# Patient Record
Sex: MALE | Race: BLACK OR AFRICAN AMERICAN | Employment: UNEMPLOYED | URBAN - METROPOLITAN AREA
[De-identification: names, ages, dates, MRNs, and addresses within clinical notes are randomized per-mention and may not be internally consistent; named-entity substitution may affect disease eponyms.]

---

## 2021-08-28 ENCOUNTER — ANCILLARY PROCEDURE (OUTPATIENT)
Dept: GENERAL RADIOLOGY | Facility: CLINIC | Age: 46
End: 2021-08-28
Attending: FAMILY MEDICINE
Payer: COMMERCIAL

## 2021-08-28 ENCOUNTER — OFFICE VISIT (OUTPATIENT)
Dept: URGENT CARE | Facility: URGENT CARE | Age: 46
End: 2021-08-28
Payer: COMMERCIAL

## 2021-08-28 VITALS
TEMPERATURE: 98 F | HEART RATE: 78 BPM | RESPIRATION RATE: 20 BRPM | DIASTOLIC BLOOD PRESSURE: 78 MMHG | OXYGEN SATURATION: 98 % | SYSTOLIC BLOOD PRESSURE: 125 MMHG

## 2021-08-28 DIAGNOSIS — M79.672 LEFT FOOT PAIN: ICD-10-CM

## 2021-08-28 DIAGNOSIS — S92.325A CLOSED NONDISPLACED FRACTURE OF SECOND METATARSAL BONE OF LEFT FOOT, INITIAL ENCOUNTER: Primary | ICD-10-CM

## 2021-08-28 DIAGNOSIS — S92.335A CLOSED NONDISPLACED FRACTURE OF THIRD METATARSAL BONE OF LEFT FOOT, INITIAL ENCOUNTER: ICD-10-CM

## 2021-08-28 DIAGNOSIS — S92.345A CLOSED NONDISPLACED FRACTURE OF FOURTH METATARSAL BONE OF LEFT FOOT, INITIAL ENCOUNTER: ICD-10-CM

## 2021-08-28 PROCEDURE — 73630 X-RAY EXAM OF FOOT: CPT | Mod: LT | Performed by: RADIOLOGY

## 2021-08-28 PROCEDURE — 99204 OFFICE O/P NEW MOD 45 MIN: CPT | Performed by: FAMILY MEDICINE

## 2021-08-28 RX ORDER — DORZOLAMIDE HCL 20 MG/ML
SOLUTION/ DROPS OPHTHALMIC
COMMUNITY
Start: 2021-08-23

## 2021-08-28 RX ORDER — LATANOPROST 50 UG/ML
SOLUTION/ DROPS OPHTHALMIC
COMMUNITY
Start: 2021-08-21

## 2021-08-28 RX ORDER — LEVOTHYROXINE SODIUM 25 UG/1
25 TABLET ORAL
COMMUNITY
Start: 2021-04-15 | End: 2022-04-15

## 2021-08-28 NOTE — PROGRESS NOTES
SUBJECTIVE:  Chief Complaint   Patient presents with     Urgent Care     Musculoskeletal Problem     L leg pain pt fell yesterday pt cant put any weight on it.     Justin Sharma is a 46 year old male presents with a chief complaint of left dorsal foot pain.  The injury occurred one day ago.   The injury happened while at home. How: Patient was walking down the stairs and fell forward, landing on the left foot..  The patient complained of a lot of pain at the proximal left dorsal foot  and has had decreased ROM.  Pain exacerbated by weight bearing and walking..  He treated it initially with ice, Ibuprofen. This is the first time this type of injury has occurred to this patient.     No previous left foot injuries.      Past Medical History:    Hypothyroidism  Bilateral glaucoma.      Current Outpatient Medications   Medication Sig Dispense Refill     dorzolamide (TRUSOPT) 2 % ophthalmic solution INSTILL 1 DROP IN BOTH EYES TWICE DAILY       latanoprost (XALATAN) 0.005 % ophthalmic solution INSTILL 1 DROP IN BOTH EYES EVERY NIGHT AT BEDTIME       levothyroxine (SYNTHROID/LEVOTHROID) 25 MCG tablet Take 25 mcg by mouth       Social History     Tobacco Use     Smoking status: Not on file   Substance Use Topics     Alcohol use: Not on file       ROS:  CONSTITUTIONAL:negative for fevers.    MUSCULOSKELETAL: positive for left foot pain and swelling.     EXAM:   /78   Pulse 78   Temp 98  F (36.7  C) (Tympanic)   Resp 20   SpO2 98%   Gen: healthy,alert,and in pain.   Extremity: left foot has edema over the entire dorsal left foot.  There is pain over the entire dorsal proximal/mid-foot region. .     X-RAY was done. I viewed all X-ray images during this clinic encounter.  The X-rays of the left foot showed a fracture at the proximal aspect of the second metatarsal bone. .    The radiology report found nondisplaced fractures at the bases of the second, third, and fourth metatarsal bones.      ASSESSMENT:   Left foot  pain  Closed fracture of the proximal second, third and fourth metatarsal bones of the left foot.      PLAN:    Crutches were given to the patient.      Rx:  Tylenol #3 (Disp:  10 tablets)    Place ice.    Elevate the left foot    Wrap the left foot with ACE bandages.      follow up with a podiatrist or orthopedic specialist for further evaluation and treatment.            Thad Davenport MD     English

## 2021-08-28 NOTE — PATIENT INSTRUCTIONS
Use the crutches to get around.  Avoid bearing weight onto the left foot.      Elevate the left foot above the level of the heart.      Place ice onto the left foot for 15 minutes at a time, every 2-3 hours while awake.      Wrap the left foot with ACE bandages.      follow up with a podiatrist or orthopedic specialist for further evaluation and treatment within the next week.  .

## 2021-08-30 ENCOUNTER — TELEPHONE (OUTPATIENT)
Dept: PODIATRY | Facility: CLINIC | Age: 46
End: 2021-08-30

## 2021-08-30 NOTE — TELEPHONE ENCOUNTER
Phone call to patient. Offered appointment on 9/1/21 with Dr. Langford with 11:00 arrival.   He then put a female on the phone to get the address. Address and phone number provided. She states patient will come to appointment and it was scheduled.     NAUN Wolfe RN

## 2021-08-30 NOTE — TELEPHONE ENCOUNTER
Patient has a 3-5 day order to be seen for Closed nondisplaced fx of second metatarsal bone of left foot.  No available openings w/in that timeframe.  Please triage and help to get pt scheduled appropriately.      Judy jordan

## 2021-09-01 ENCOUNTER — OFFICE VISIT (OUTPATIENT)
Dept: PODIATRY | Facility: CLINIC | Age: 46
End: 2021-09-01
Payer: COMMERCIAL

## 2021-09-01 VITALS — DIASTOLIC BLOOD PRESSURE: 80 MMHG | SYSTOLIC BLOOD PRESSURE: 120 MMHG | HEIGHT: 70 IN

## 2021-09-01 DIAGNOSIS — S92.325A CLOSED NONDISPLACED FRACTURE OF SECOND METATARSAL BONE OF LEFT FOOT, INITIAL ENCOUNTER: ICD-10-CM

## 2021-09-01 DIAGNOSIS — S93.325A LISFRANC DISLOCATION, LEFT, INITIAL ENCOUNTER: Primary | ICD-10-CM

## 2021-09-01 PROCEDURE — 99204 OFFICE O/P NEW MOD 45 MIN: CPT | Performed by: PODIATRIST

## 2021-09-01 RX ORDER — LORATADINE 10 MG/1
10 TABLET ORAL DAILY PRN
COMMUNITY

## 2021-09-01 NOTE — PROGRESS NOTES
ASSESSMENT:  Encounter Diagnoses   Name Primary?     Closed nondisplaced fracture of second metatarsal bone of left foot, initial encounter      Lisfranc fracture/dislocation, left, initial encounter Yes     MEDICAL DECISION MAKING:  I discussed the potential severity of this injury, including risk of long-term consequences including pain, midfoot arthritis and surgical intervention.    I personally reviewed the x-ray images and shared my findings with Justin and his wife.    The treatment plan reviewed included:  MRI to better characterize the extent of injury.  Nonweightbearing for 6 to 8 weeks  Tall Aircast for protection.  He was instructed to remove this for ankle range of motion exercises.  In addition to crutches, a wheeled knee walker was ordered.  We discussed icing and elevating.    The fractures are minimally displaced and there is no gross dislocation across the tarsometatarsal joints.  I favor a conservative approach, however explained that he might need a surgical intervention in the future.  This would involve fusion and stabilization of arthritic joints, if significant pain.  Otherwise midfoot arthritic pain might be controlled with custom orthoses and quality shoes.    Follow-up in 3 weeks recommended.    Disclaimer: This note consists of symbols derived from keyboarding, dictation and/or voice recognition software. As a result, there may be errors in the script that have gone undetected. Please consider this when interpreting information found in this chart.    Justin Langford DPM, FACFAS, MS    [place] [facility] Pod[facility]/Foot & Ankle Surgery      ____________________________________________________________________    HPI:       Follow up from an Urgent Care visit with Dr. Davenport on 8/28/21.  Chief Complaint: left foot injury  He reports falling forwards down stairs on Friday. Immediate pain.   He was evaluated in urgent care.  X-rays show nondisplaced fractures at the base of the second third  "and fourth metatarsal, intra-articular.  He was provided crutches Tylenol 3 instructed to ice and elevate.  He was referred to podiatry for ongoing management.  Current pain is an 8 out of 10 at worst.  He is not currently employed.    *No past medical history on file.*  *No past surgical history on file.*  *  Current Outpatient Medications   Medication Sig Dispense Refill     dorzolamide (TRUSOPT) 2 % ophthalmic solution INSTILL 1 DROP IN BOTH EYES TWICE DAILY       latanoprost (XALATAN) 0.005 % ophthalmic solution INSTILL 1 DROP IN BOTH EYES EVERY NIGHT AT BEDTIME       levothyroxine (SYNTHROID/LEVOTHROID) 25 MCG tablet Take 25 mcg by mouth       loratadine (CLARITIN) 10 MG tablet Take 10 mg by mouth daily as needed for allergies         EXAM:    Vitals: /80   Ht 1.778 m (5' 10\")   BMI: There is no height or weight on file to calculate BMI.    Constitutional:  Justin Sharma is in no apparent distress, appears well-nourished.  Cooperative with history and physical exam.    Vascular:  Pedal pulses are palpable for both the DP and PT arteries.  CFT < 3 sec. significant left foot edema.    Neuro: Light touch sensation is intact to the L4, L5, S1 distributions  No evidence of weakness, spasticity, or contracture in the lower extremities.     Derm: Normal texture and turgor.  No erythema, ecchymosis, or cyanosis.  No open lesions.  No fracture blisters.  No ecchymosis seen in the plantar arch of the left foot.    Musculoskeletal:    Lower extremity muscle strength is normal. No gross deformities.      X-Ray Findings:   Examination:  XR FOOT LEFT G/E 3 VIEWS     Date:  8/28/2021 11:03 AM      Clinical Information: Left pain and swelling.     Comparison: none.                                                                      Impression:     1.  Acute minimally displaced fractures involving the bases of the  second, third, and fourth metatarsals, with intra-articular extension  to the TMT joints. Minimal " fracture displacement, 2 mm. No clear  diastases of the Lisfranc ligament/joint space. Consider CT for more  complete characterization.     2.  No fracture visualized elsewhere. No significant joint subluxation  or dislocation. Soft tissue swelling in the midfoot.     KAIN PUTNAM MD

## 2021-09-01 NOTE — PATIENT INSTRUCTIONS
Thank you for choosing [facility] Podiatry / Foot & Ankle Surgery!    DR. HENDRIX'S CLINIC LOCATIONS     [facility] SCHEDULE SURGERY: [phone]   [address] [place] [address] APPOINTMENTS: [phone]   [place] [address] BILLING QUESTIONS: [phone]  [phone] RADIOLOGY: [phone]       [facility]    [address]    [place] [address]    [phone]  [phone]      Follow up: 3 weeks    Please call to schedule your MRI/CT/Ultrasound/Arthrogram appointment.  The number is [phone].    Home Medical Equipment:  1. [facility]    [facility] -[address]  [address]   [place] [phone]     2. [facility] - [address] [place] [address], [place], [phone]  LlSFRANC INJURY     Lisfranc injuries involve the midfoot area. The Lisfranc jOints are between the first metatarsal, second metarsal, medial cuneiform and intermediate cuneiform. The Lisfranc complex involves all of the tarsometatarsal joints. There is also a Lisfranc ligament that runs on the bottom of the medial cuneiform to the second metatarsal base. Lisfranc  injuries, to the midfoot, can include sprains, dislocations, fractures (broken bones) or all of the above. Two very serious complications can be seen with Lisfranc injuries. They' are injury to the dorsalis pedis artery or compartment syndrome. These are both medical emergencies and need surgery right away.     CAUSES   Lisfranc injuries are most often caused by some type of trauma. The trauma can be   either direct or indirect. An example of direct trauma would be dropping something on   the foot. Indirect trauma usually involves a twisting of the foot after it gets caught on   something. Lisfranc injuries account for about one percent of all fractures. They are not common, but sports medicine doctors tend to see the injury more frequently.  Lisfranc injuries can occur in car accidents,  personnel, horse riders and in many sports such as; football, baseball and soccer to name a few.     SIGNS & SYMPTOMS   Swelling of the foot and/or ankle, bruising of the foot and/or ankle, pain usually in the middle part of the foot, difficulty stepping on the foot (due to pain), widening of the midfoot area and large bump on the top midfoot area (due to bone dislocation)     DIAGNOSIS   First your doctor will do a thorough history and physical exam. He or she will be sure to check your pulse (dorsalis pedis artery) on the top of your foot because sometimes this artery can be injured due to the trauma. Your doctor will also make sure you do not have a compartment syndrome, which is a build-up of pressure in the foot causing excruciating pain. The excessive pressure can cause damage to the soft tissues, nerves, arteries and muscles. Compartment syndrome is a medical emergency that requires surgery to relieve the pressure. Next, you will probably get x-rays of your injured foot. The x-ray may be weight bearing (standing on the foot) arnon-weight bearing. Sometimes your healthy foot is also x-rayed for comparison. Lisfranc injuries can be difficult to see on plain x-rays. If there is a question about it, your doctor may send you for a CT (computed tomography) scan. A CT scan will allow the doctor to see the bones in much better detail as it has three dimensional views. An MRI (magnetic resonance imaging) is also occasionally used to evaluate the soft tissues, especially the Lisfranc ligament. An MRI is not usually needed if there is dislocation or fracture because this usually signifies that the Lisfranc ligament has already ruptured. If the dorsalis pedis artery is not felt by hand, then a Doppler ultrasound may be needed to assess the artery.     TREATMENT   The treatment for Lisfranc injuries varies depending on if you had a sprain, dislocation or fracture or  all of the above. Treatment also depends on when you were diagnosed with the injury. Generally speaking, if you have sustained a Lisfranc sprain you will need to be non-weight bearing (no weight on the foot) in a cast or removable cast boot for 4-6 weeks. This will usually be followed by physical therapy and gradual return to activity. Lisfranc dislocation and fracture injuries are usually treated with surgery. It is important to try and line up the bones and joints as much as possible. This is done with wires, screws or plates. After surgery, you are usually non-weight bearing for six weeks and then you begin walking with a cast walking boot for another four weeks. If the bones and joints are not lined up properly there is increased risk of degenerative changes which can lead to arthritis and instability. If pain in the midfoot continues for a long time, sometimes a second surgery is needed to fuse (arthrodese) the midfoot bones together.     AIRCAST / CAM WALKING BOOT INSTRUCTIONS  - Do NOT drive with CAM walker on. This is due to safety and legal issues.   - Do NOT wear the CAM walker on long car/train rides or on an airplane.  - Remove the CAM walker several times a day and do ankle range of motion (ROM) exercises/wiggle toes.  - It is recommended that a thick-soled shoe be worn on the other foot to offset any created leg length issue.   - The boot does not have to be worn at night.   - There is an increased risk of developing a blood clot with lower extremity immobilization. ROM exercises and knee-high compression (tenso /ACE wrap) is recommended to lower that risk.   - You should seek medical attention if you experience calf swelling and/or pain, chest pain, or shortness of breath.     If you need to return or exchange the boot, please contact [facility] @ [phone]

## 2021-09-01 NOTE — LETTER
9/1/2021         RE: Justin Sharma  [address]  [place] [address]        Dear Colleague,    Thank you for referring your patient, Justin Sharma, to the [facility] PODIATRY. Please see a copy of my visit note below.    ASSESSMENT:  Encounter Diagnoses   Name Primary?     Closed nondisplaced fracture of second metatarsal bone of left foot, initial encounter      Lisfranc fracture/dislocation, left, initial encounter Yes     MEDICAL DECISION MAKING:  I discussed the potential severity of this injury, including risk of long-term consequences including pain, midfoot arthritis and surgical intervention.    I personally reviewed the x-ray images and shared my findings with Justin and his wife.    The treatment plan reviewed included:  MRI to better characterize the extent of injury.  Nonweightbearing for 6 to 8 weeks  Tall Aircast for protection.  He was instructed to remove this for ankle range of motion exercises.  In addition to crutches, a wheeled knee walker was ordered.  We discussed icing and elevating.    The fractures are minimally displaced and there is no gross dislocation across the tarsometatarsal joints.  I favor a conservative approach, however explained that he might need a surgical intervention in the future.  This would involve fusion and stabilization of arthritic joints, if significant pain.  Otherwise midfoot arthritic pain might be controlled with custom orthoses and quality shoes.    Follow-up in 3 weeks recommended.    Disclaimer: This note consists of symbols derived from keyboarding, dictation and/or voice recognition software. As a result, there may be errors in the script that have gone undetected. Please consider this when interpreting information found in this chart.    Justin Langford, HOLLIS, FACFAS, [facility] Department of Podiatry/Foot & Ankle Surgery      ____________________________________________________________________    HPI:       Follow up from an  "Urgent Care visit with Dr. Davenport on 8/28/21.  Chief Complaint: left foot injury  He reports falling forwards down stairs on Friday. Immediate pain.   He was evaluated in urgent care.  X-rays show nondisplaced fractures at the base of the second third and fourth metatarsal, intra-articular.  He was provided crutches Tylenol 3 instructed to ice and elevate.  He was referred to podiatry for ongoing management.  Current pain is an 8 out of 10 at worst.  He is not currently employed.    *No past medical history on file.*  *No past surgical history on file.*  *  Current Outpatient Medications   Medication Sig Dispense Refill     dorzolamide (TRUSOPT) 2 % ophthalmic solution INSTILL 1 DROP IN BOTH EYES TWICE DAILY       latanoprost (XALATAN) 0.005 % ophthalmic solution INSTILL 1 DROP IN BOTH EYES EVERY NIGHT AT BEDTIME       levothyroxine (SYNTHROID/LEVOTHROID) 25 MCG tablet Take 25 mcg by mouth       loratadine (CLARITIN) 10 MG tablet Take 10 mg by mouth daily as needed for allergies         EXAM:    Vitals: /80   Ht 1.778 m (5' 10\")   BMI: There is no height or weight on file to calculate BMI.    Constitutional:  Justin Sharma is in no apparent distress, appears well-nourished.  Cooperative with history and physical exam.    Vascular:  Pedal pulses are palpable for both the DP and PT arteries.  CFT < 3 sec. significant left foot edema.    Neuro: Light touch sensation is intact to the L4, L5, S1 distributions  No evidence of weakness, spasticity, or contracture in the lower extremities.     Derm: Normal texture and turgor.  No erythema, ecchymosis, or cyanosis.  No open lesions.  No fracture blisters.  No ecchymosis seen in the plantar arch of the left foot.    Musculoskeletal:    Lower extremity muscle strength is normal. No gross deformities.      X-Ray Findings:   Examination:  XR FOOT LEFT G/E 3 VIEWS     Date:  8/28/2021 11:03 AM      Clinical Information: Left pain and swelling.     Comparison: none.        "                                                               Impression:     1.  Acute minimally displaced fractures involving the bases of the  second, third, and fourth metatarsals, with intra-articular extension  to the TMT joints. Minimal fracture displacement, 2 mm. No clear  diastases of the Lisfranc ligament/joint space. Consider CT for more  complete characterization.     2.  No fracture visualized elsewhere. No significant joint subluxation  or dislocation. Soft tissue swelling in the midfoot.     KAIN PUTNAM MD             Again, thank you for allowing me to participate in the care of your patient.        Sincerely,        Justin Langford DPM

## 2021-09-01 NOTE — LETTER
[facility] PODIATRY  [address]  [address]  [place] [address]  Phone: [phone]  Fax: [phone]      REPORT OF WORK ABILITY    Date: 9/1/2021                     Employee Name: Justin Sharma         YOB: 1975      To Whom It May Concern:    Justin Sharma was evaluated in our clinic today. He has sustained a left foot injury involving multiple fractures and possible ligament injury.  At this time it is recommended that he not work at all.  As of 9/27/21, seated work only is reasonable.  An MRI is ordered. He will be non weight bearing for 6-8 weeks a will follow up in clinic in 3-4 weeks.        Justin Langford DPM, FACFAS, MS  [facility] Department of Podiatry/Foot & Ankle Surgery

## 2021-09-23 ENCOUNTER — ANCILLARY PROCEDURE (OUTPATIENT)
Dept: GENERAL RADIOLOGY | Facility: CLINIC | Age: 46
End: 2021-09-23
Attending: PODIATRIST
Payer: COMMERCIAL

## 2021-09-23 ENCOUNTER — HOSPITAL ENCOUNTER (OUTPATIENT)
Dept: MRI IMAGING | Facility: CLINIC | Age: 46
Discharge: HOME | End: 2021-09-23
Attending: PODIATRIST | Admitting: PODIATRIST
Payer: COMMERCIAL

## 2021-09-23 ENCOUNTER — OFFICE VISIT (OUTPATIENT)
Dept: PODIATRY | Facility: CLINIC | Age: 46
End: 2021-09-23
Payer: COMMERCIAL

## 2021-09-23 VITALS — HEIGHT: 70 IN | DIASTOLIC BLOOD PRESSURE: 74 MMHG | SYSTOLIC BLOOD PRESSURE: 122 MMHG

## 2021-09-23 DIAGNOSIS — S92.325A CLOSED NONDISPLACED FRACTURE OF SECOND METATARSAL BONE OF LEFT FOOT, INITIAL ENCOUNTER: ICD-10-CM

## 2021-09-23 DIAGNOSIS — S93.325A LISFRANC DISLOCATION, LEFT, INITIAL ENCOUNTER: ICD-10-CM

## 2021-09-23 DIAGNOSIS — S92.345D CLOSED NONDISPLACED FRACTURE OF FOURTH METATARSAL BONE OF LEFT FOOT WITH ROUTINE HEALING, SUBSEQUENT ENCOUNTER: ICD-10-CM

## 2021-09-23 DIAGNOSIS — S92.325D CLOSED NONDISPLACED FRACTURE OF SECOND METATARSAL BONE OF LEFT FOOT WITH ROUTINE HEALING, SUBSEQUENT ENCOUNTER: ICD-10-CM

## 2021-09-23 DIAGNOSIS — S92.334D CLOSED NONDISPLACED FRACTURE OF THIRD METATARSAL BONE OF RIGHT FOOT WITH ROUTINE HEALING, SUBSEQUENT ENCOUNTER: ICD-10-CM

## 2021-09-23 DIAGNOSIS — S93.325A LISFRANC DISLOCATION, LEFT, INITIAL ENCOUNTER: Primary | ICD-10-CM

## 2021-09-23 PROCEDURE — 73630 X-RAY EXAM OF FOOT: CPT | Mod: LT | Performed by: RADIOLOGY

## 2021-09-23 PROCEDURE — 73718 MRI LOWER EXTREMITY W/O DYE: CPT | Mod: LT

## 2021-09-23 PROCEDURE — 99213 OFFICE O/P EST LOW 20 MIN: CPT | Performed by: PODIATRIST

## 2021-09-23 PROCEDURE — 73718 MRI LOWER EXTREMITY W/O DYE: CPT | Mod: 26 | Performed by: RADIOLOGY

## 2021-09-23 NOTE — PATIENT INSTRUCTIONS
Thank you for choosing [facility] Podiatry / Foot & Ankle Surgery!    DR. HENDRIX'S CLINIC LOCATIONS     [facility] SCHEDULE SURGERY: [phone]   [address] [place] [address] APPOINTMENTS: [phone]   [place] [address] BILLING QUESTIONS: [phone]  [phone] RADIOLOGY: [phone]       [facility]    [address]    [place] [address]    [phone]  [phone]      MRI needed - [phone]    Next steps: 4 more weeks of nonweight bearing, come back in a month

## 2021-09-23 NOTE — LETTER
9/23/2021         RE: Justin Sharma  [address]  [place] [address]        Dear Colleague,    Thank you for referring your patient, Justin Sharma, to the [facility] PODIATRY. Please see a copy of my visit note below.    ASSESSMENT:  Encounter Diagnoses   Name Primary?     Lisfranc dislocation, left, initial encounter Yes     Closed nondisplaced fracture of third metatarsal bone of right foot with routine healing, subsequent encounter      Closed nondisplaced fracture of fourth metatarsal bone of left foot with routine healing, subsequent encounter      Closed nondisplaced fracture of second metatarsal bone of left foot with routine healing, subsequent encounter        MEDICAL DECISION MAKING:  Given the extent of injury and ongoing edema, I asked him to be nonweightbearing for at least 8 weeks.  I encouraged him to continue removing the cam walker when seated and doing ankle range of motion exercises.  Ice and elevation will still be helpful.  He was to start a new job.  They are holding his position currently..  He is not able to return to work with crutches or a walking boot.  I explained that I will provide a letter releasing him to work when he is able to walk comfortably in regular shoes.  I cannot give him a definite timeline.  I also encouraged she follow-up for the MRI, as this will give us information involving injury beyond bone.  I reviewed the severity of the injury, the possibility of future arthrosis and need for surgery involving joint fusions.  Follow-up in 1 month.    Disclaimer: This note consists of symbols derived from keyboarding, dictation and/or voice recognition software. As a result, there may be errors in the script that have gone undetected. Please consider this when interpreting information found in this chart.    Justin Langford DPM, FACFAS, MS    [place] Department of Podiatry/Foot & Ankle  "Surgery      ____________________________________________________________________    HPI:       Justin and his wife follow-up for his left foot injury.  Please refer to the 9/1/2021 note for details regarding the injury.  He sustained fractures of the left second, third and fourth metatarsal bases with intra-articular extension.   This is considered a Lisfranc type injury.  An MRI was ordered to better characterize the injury.  Decided to forego the MRI.  He has been nonweightbearing with crutches and wearing an Aircast for protection.  Reports pain reduction  There is some persistent swelling of the foot.    *No past medical history on file.*  *No past surgical history on file.*  *  Current Outpatient Medications   Medication Sig Dispense Refill     dorzolamide (TRUSOPT) 2 % ophthalmic solution INSTILL 1 DROP IN BOTH EYES TWICE DAILY       latanoprost (XALATAN) 0.005 % ophthalmic solution INSTILL 1 DROP IN BOTH EYES EVERY NIGHT AT BEDTIME       levothyroxine (SYNTHROID/LEVOTHROID) 25 MCG tablet Take 25 mcg by mouth       loratadine (CLARITIN) 10 MG tablet Take 10 mg by mouth daily as needed for allergies           EXAM:    Vitals: /74   Ht 1.778 m (5' 10\")   BMI: There is no height or weight on file to calculate BMI.    Constitutional:  Justin Sharma is in no apparent distress, appears well-nourished.  Cooperative with history and physical exam.    Vascular:  Pedal pulses are palpable for both the DP and PT arteries.  CFT < 3 sec.  No edema.      Neuro: Light touch sensation is intact to the L4, L5, S1 distributions  No evidence of weakness, spasticity, or contracture in the lower extremities.     Derm: Normal texture and turgor.  No erythema, ecchymosis, or cyanosis.  No open lesions.     Musculoskeletal:    Lower extremity muscle strength is normal. No gross deformities.  No evidence of any decrease in arch height since last visit.  No obvious forefoot abduction.    X-Ray Findings:  I personally " reviewed the right foot images.  Unchanged from previous exam.  I do not appreciate any interval displacement.              Again, thank you for allowing me to participate in the care of your patient.        Sincerely,        Justin Langford DPM

## 2021-09-23 NOTE — PROGRESS NOTES
ASSESSMENT:  Encounter Diagnoses   Name Primary?     Lisfranc dislocation, left, initial encounter Yes     Closed nondisplaced fracture of third metatarsal bone of right foot with routine healing, subsequent encounter      Closed nondisplaced fracture of fourth metatarsal bone of left foot with routine healing, subsequent encounter      Closed nondisplaced fracture of second metatarsal bone of left foot with routine healing, subsequent encounter        MEDICAL DECISION MAKING:  Given the extent of injury and ongoing edema, I asked him to be nonweightbearing for at least 8 weeks.  I encouraged him to continue removing the cam walker when seated and doing ankle range of motion exercises.  Ice and elevation will still be helpful.  He was to start a new job.  They are holding his position currently..  He is not able to return to work with crutches or a walking boot.  I explained that I will provide a letter releasing him to work when he is able to walk comfortably in regular shoes.  I cannot give him a definite timeline.  I also encouraged she follow-up for the MRI, as this will give us information involving injury beyond bone.  I reviewed the severity of the injury, the possibility of future arthrosis and need for surgery involving joint fusions.  Follow-up in 1 month.    Disclaimer: This note consists of symbols derived from keyboarding, dictation and/or voice recognition software. As a result, there may be errors in the script that have gone undetected. Please consider this when interpreting information found in this chart.    Justin Langford DPM, FACFAS, MS    [facility]      ____________________________________________________________________    HPI:       Justin and his wife follow-up for his left foot injury.  Please refer to the 9/1/2021 note for details regarding the injury.  He sustained fractures of the left second, third and fourth metatarsal bases with  "intra-articular extension.   This is considered a Lisfranc type injury.  An MRI was ordered to better characterize the injury.  Decided to forego the MRI.  He has been nonweightbearing with crutches and wearing an Aircast for protection.  Reports pain reduction  There is some persistent swelling of the foot.    *No past medical history on file.*  *No past surgical history on file.*  *  Current Outpatient Medications   Medication Sig Dispense Refill     dorzolamide (TRUSOPT) 2 % ophthalmic solution INSTILL 1 DROP IN BOTH EYES TWICE DAILY       latanoprost (XALATAN) 0.005 % ophthalmic solution INSTILL 1 DROP IN BOTH EYES EVERY NIGHT AT BEDTIME       levothyroxine (SYNTHROID/LEVOTHROID) 25 MCG tablet Take 25 mcg by mouth       loratadine (CLARITIN) 10 MG tablet Take 10 mg by mouth daily as needed for allergies           EXAM:    Vitals: /74   Ht 1.778 m (5' 10\")   BMI: There is no height or weight on file to calculate BMI.    Constitutional:  Justin Sharma is in no apparent distress, appears well-nourished.  Cooperative with history and physical exam.    Vascular:  Pedal pulses are palpable for both the DP and PT arteries.  CFT < 3 sec.  No edema.      Neuro: Light touch sensation is intact to the L4, L5, S1 distributions  No evidence of weakness, spasticity, or contracture in the lower extremities.     Derm: Normal texture and turgor.  No erythema, ecchymosis, or cyanosis.  No open lesions.     Musculoskeletal:    Lower extremity muscle strength is normal. No gross deformities.  No evidence of any decrease in arch height since last visit.  No obvious forefoot abduction.    X-Ray Findings:  I personally reviewed the right foot images.  Unchanged from previous exam.  I do not appreciate any interval displacement.          "

## 2021-10-21 ENCOUNTER — ANCILLARY PROCEDURE (OUTPATIENT)
Dept: GENERAL RADIOLOGY | Facility: CLINIC | Age: 46
End: 2021-10-21
Attending: PODIATRIST
Payer: COMMERCIAL

## 2021-10-21 ENCOUNTER — OFFICE VISIT (OUTPATIENT)
Dept: PODIATRY | Facility: CLINIC | Age: 46
End: 2021-10-21
Payer: COMMERCIAL

## 2021-10-21 VITALS — HEIGHT: 70 IN | SYSTOLIC BLOOD PRESSURE: 124 MMHG | DIASTOLIC BLOOD PRESSURE: 78 MMHG

## 2021-10-21 DIAGNOSIS — S99.922D INJURY OF LEFT FOOT, SUBSEQUENT ENCOUNTER: Primary | ICD-10-CM

## 2021-10-21 PROCEDURE — 73630 X-RAY EXAM OF FOOT: CPT | Mod: LT | Performed by: RADIOLOGY

## 2021-10-21 PROCEDURE — 99214 OFFICE O/P EST MOD 30 MIN: CPT | Performed by: PODIATRIST

## 2021-10-21 NOTE — LETTER
10/21/2021         RE: Justin Sharma  [address]  [place] [address]        Dear Colleague,    Thank you for referring your patient, Justin Sharma, to the [facility]. Please see a copy of my visit note below.    ASSESSMENT:  Encounter Diagnosis   Name Primary?     Injury of left foot, subsequent encounter Yes   Multiple midfoot fractures as noted below.    MEDICAL DECISION MAKING:  Lisfranc injury, left foot involving multiple fractures: Base of first metatarsal, medial cuneiform, base of second metatarsal, base of third metatarsal, base of fourth metatarsal, lateral cuboid, intermediate cuneiform, lateral cuneiform, hallux proximal phalanx.    He has protected this foot for nearly 8 weeks, initially involving crutches with a cam walker.  He is ambulating in the cam walker and reports ongoing improvement with pain being at worst, 5 out of 10.  There has been interval reduction in edema.    I reviewed the x-ray images with Justin, as well as the MRI report.  I explained that this is a serious injury for a foot.  He is at high risk for future arthrosis.  If significant future pain, midfoot arthrodesis is an option.    At this time I asked him to complete a full 8 weeks in the cam walker, attempt to wean out of it and wear supportive shoes.    He is referred for custom molded multidensity orthotic devices.  Hopefully these will help keep his foot comfortable and reduce any pain from arthrosis.    Recommend he not attempt any weightbearing work for another month.  Follow-up in 1 month.    Disclaimer: This note consists of symbols derived from keyboarding, dictation and/or voice recognition software. As a result, there may be errors in the script that have gone undetected. Please consider this when interpreting information found in this chart.    Justin Langford, HOLLIS, FACFAS, MS    [facility]le  "Surgery      ____________________________________________________________________    HPI:       Justin follows up for his left foot injury.  Please refer to the 9/1/2021 note for details regarding the injury.  He sustained fractures of the left second, third and fourth metatarsal bases with intra-articular extension seen in XR.  An MRI was completed after his last visit, 9/23/21 j- see below  This is considered a Lisfranc type injury.  He was initially nonweightbearing with crutches and wearing an Aircast for protection.  Today he presents in the cam walker alone.  He reports interval reduction in pain.  Pain, at worst, is a 5 out of 10.    *No past medical history on file.*  *No past surgical history on file.*  *  Current Outpatient Medications   Medication Sig Dispense Refill     dorzolamide (TRUSOPT) 2 % ophthalmic solution INSTILL 1 DROP IN BOTH EYES TWICE DAILY       latanoprost (XALATAN) 0.005 % ophthalmic solution INSTILL 1 DROP IN BOTH EYES EVERY NIGHT AT BEDTIME       levothyroxine (SYNTHROID/LEVOTHROID) 25 MCG tablet Take 25 mcg by mouth       loratadine (CLARITIN) 10 MG tablet Take 10 mg by mouth daily as needed for allergies           EXAM:    Vitals: Ht 1.778 m (5' 10\")   BMI: There is no height or weight on file to calculate BMI.    Constitutional:  Justin Sharma is in no apparent distress, appears well-nourished.  Cooperative with history and physical exam.    Vascular:  Pedal pulses are palpable for both the DP and PT arteries.  CFT < 3 sec. interval reduction in left foot edema.    Neuro: Light touch sensation is intact to the L4, L5, S1 distributions  No evidence of weakness, spasticity, or contracture in the lower extremities.     Derm: Normal texture and turgor.  No erythema, ecchymosis, or cyanosis.  No open lesions.     Musculoskeletal:    Lower extremity muscle strength is normal. No gross deformities.  No evidence of any decrease in arch height since last visit.  No obvious forefoot " abduction.  He has some pain on palpation over the left cuboid.  No pain with light manipulation of the midfoot joints.    X-Ray Findings:  I personally reviewed the right foot images.  Unchanged from previous exam.  I do not appreciate any interval displacement.    MR left foot without  contrast 9/23/2021 4:42 PM     History: Foot trauma, Lisfranc suspected, xray done (Age >= 6y);  Closed nondisplaced fracture of second metatarsal bone of left foot,  initial encounter; Lisfranc dislocation, left, initial encounter     Techniques: Multiplanar multisequence imaging of the left foot was  obtained without  administration of intravenous contrast.     Comparison: Radiographs 8/28/2021, 9/23/2021     Findings: Images degraded by motion.     Bones     Nondisplaced intra-articular fracture of the base of the first  metatarsal. Suspect nondisplaced fracture through the medial cuneiform  on sagittal series 7 images 8-10. Comminuted and mildly displaced  intra-articular fracture of the base of the second metatarsal.  Minimally displaced intra-articular fracture of the base of the third  metatarsal. Mild interspace intra-articular fracture of the plantar  base of the fourth metatarsal. Nondisplaced fracture of the distal  lateral cuboid. Probable fracture of the distal lateral intermediate  cuneiform. Probable fracture of the lateral cuneiform as well. Bone  marrow edema like signal throughout the shaft of the second  metatarsal, likely posttraumatic versus stress.      Nondisplaced intra-articular fracture of the head of the first  proximal phalanx, radiographically occult. Loss of fat saturation of  the toes on long axis imaging.     Joints and periarticular soft tissue     Joint effusion: Physiologic amount of joint fluid are present.     Plantar plates: Intersesamoidal ligament and sesamoidal phalangeal  ligaments of the first metatarsophalangeal joints are intact. Plantar  plates of the second through fifth toe at  metatarsophalangeal joints  are grossly intact.     Intermetatarsal spaces: Mild nonspecific soft tissue prominence in the  second interspace an third interspace. No significant intermetatarsal  bursal fluid.     Ligaments and Tendons     Lisfranc interosseous ligament: Lisfranc evaluation compromised by  motion, especially on short axis imaging. Thickening and increased  signal in the interosseous component. Dorsal component not clearly  seen. Thickening and increased signal in the plantar component between  the medial cuneiform and base of the third metatarsal. Plantar  component between the medial cuneiform and base of the second  metatarsal not clearly seen, possibly torn.     Tendons: The visualized courses of flexor and extensor tendons are  grossly intact.     Muscles     Multifocal muscular edema.     ANCILLARY FINDINGS     Dorsal subcutaneous edema.                                                                      Impression: Examination compromised by motion.     1. Multiple intra-articular tarsometatarsal fractures, as detailed  above.  a. Radiographically occult nondisplaced fracture through the head of  the first proximal phalanx.     2. At least a moderate grade Lisfranc complex ligamentous sprain as  detailed above, suboptimally detailed due to patient motion.     MYA DOW MD (Joe)           Again, thank you for allowing me to participate in the care of your patient.        Sincerely,        Justin Langford DPM

## 2021-10-21 NOTE — PROGRESS NOTES
ASSESSMENT:  Encounter Diagnosis   Name Primary?     Injury of left foot, subsequent encounter Yes   Multiple midfoot fractures as noted below.    MEDICAL DECISION MAKING:  Lisfranc injury, left foot involving multiple fractures: Base of first metatarsal, medial cuneiform, base of second metatarsal, base of third metatarsal, base of fourth metatarsal, lateral cuboid, intermediate cuneiform, lateral cuneiform, hallux proximal phalanx.    He has protected this foot for nearly 8 weeks, initially involving crutches with a cam walker.  He is ambulating in the cam walker and reports ongoing improvement with pain being at worst, 5 out of 10.  There has been interval reduction in edema.    I reviewed the x-ray images with Justin, as well as the MRI report.  I explained that this is a serious injury for a foot.  He is at high risk for future arthrosis.  If significant future pain, midfoot arthrodesis is an option.    At this time I asked him to complete a full 8 weeks in the cam walker, attempt to wean out of it and wear supportive shoes.    He is referred for custom molded multidensity orthotic devices.  Hopefully these will help keep his foot comfortable and reduce any pain from arthrosis.    Recommend he not attempt any weightbearing work for another month.  Follow-up in 1 month.    Disclaimer: This note consists of symbols derived from keyboarding, dictation and/or voice recognition software. As a result, there may be errors in the script that have gone undetected. Please consider this when interpreting information found in this chart.    Justin Langford DPM, FACFAS, MS    [facility] & [facility]le Surgery      ____________________________________________________________________    HPI:       Justin follows up for his left foot injury.  Please refer to the 9/1/2021 note for details regarding the injury.  He sustained fractures of the left second, third and fourth metatarsal bases with intra-articular  "extension seen in XR.  An MRI was completed after his last visit, 9/23/21 j- see below  This is considered a Lisfranc type injury.  He was initially nonweightbearing with crutches and wearing an Aircast for protection.  Today he presents in the cam walker alone.  He reports interval reduction in pain.  Pain, at worst, is a 5 out of 10.    *No past medical history on file.*  *No past surgical history on file.*  *  Current Outpatient Medications   Medication Sig Dispense Refill     dorzolamide (TRUSOPT) 2 % ophthalmic solution INSTILL 1 DROP IN BOTH EYES TWICE DAILY       latanoprost (XALATAN) 0.005 % ophthalmic solution INSTILL 1 DROP IN BOTH EYES EVERY NIGHT AT BEDTIME       levothyroxine (SYNTHROID/LEVOTHROID) 25 MCG tablet Take 25 mcg by mouth       loratadine (CLARITIN) 10 MG tablet Take 10 mg by mouth daily as needed for allergies           EXAM:    Vitals: Ht 1.778 m (5' 10\")   BMI: There is no height or weight on file to calculate BMI.    Constitutional:  Justin Sharma is in no apparent distress, appears well-nourished.  Cooperative with history and physical exam.    Vascular:  Pedal pulses are palpable for both the DP and PT arteries.  CFT < 3 sec. interval reduction in left foot edema.    Neuro: Light touch sensation is intact to the L4, L5, S1 distributions  No evidence of weakness, spasticity, or contracture in the lower extremities.     Derm: Normal texture and turgor.  No erythema, ecchymosis, or cyanosis.  No open lesions.     Musculoskeletal:    Lower extremity muscle strength is normal. No gross deformities.  No evidence of any decrease in arch height since last visit.  No obvious forefoot abduction.  He has some pain on palpation over the left cuboid.  No pain with light manipulation of the midfoot joints.    X-Ray Findings:  I personally reviewed the right foot images.  Unchanged from previous exam.  I do not appreciate any interval displacement.    MR left foot without  contrast 9/23/2021 4:42 " PM     History: Foot trauma, Lisfranc suspected, xray done (Age >= 6y);  Closed nondisplaced fracture of second metatarsal bone of left foot,  initial encounter; Lisfranc dislocation, left, initial encounter     Techniques: Multiplanar multisequence imaging of the left foot was  obtained without  administration of intravenous contrast.     Comparison: Radiographs 8/28/2021, 9/23/2021     Findings: Images degraded by motion.     Bones     Nondisplaced intra-articular fracture of the base of the first  metatarsal. Suspect nondisplaced fracture through the medial cuneiform  on sagittal series 7 images 8-10. Comminuted and mildly displaced  intra-articular fracture of the base of the second metatarsal.  Minimally displaced intra-articular fracture of the base of the third  metatarsal. Mild interspace intra-articular fracture of the plantar  base of the fourth metatarsal. Nondisplaced fracture of the distal  lateral cuboid. Probable fracture of the distal lateral intermediate  cuneiform. Probable fracture of the lateral cuneiform as well. Bone  marrow edema like signal throughout the shaft of the second  metatarsal, likely posttraumatic versus stress.      Nondisplaced intra-articular fracture of the head of the first  proximal phalanx, radiographically occult. Loss of fat saturation of  the toes on long axis imaging.     Joints and periarticular soft tissue     Joint effusion: Physiologic amount of joint fluid are present.     Plantar plates: Intersesamoidal ligament and sesamoidal phalangeal  ligaments of the first metatarsophalangeal joints are intact. Plantar  plates of the second through fifth toe at metatarsophalangeal joints  are grossly intact.     Intermetatarsal spaces: Mild nonspecific soft tissue prominence in the  second interspace an third interspace. No significant intermetatarsal  bursal fluid.     Ligaments and Tendons     Lisfranc interosseous ligament: Lisfranc evaluation compromised by  motion,  especially on short axis imaging. Thickening and increased  signal in the interosseous component. Dorsal component not clearly  seen. Thickening and increased signal in the plantar component between  the medial cuneiform and base of the third metatarsal. Plantar  component between the medial cuneiform and base of the second  metatarsal not clearly seen, possibly torn.     Tendons: The visualized courses of flexor and extensor tendons are  grossly intact.     Muscles     Multifocal muscular edema.     ANCILLARY FINDINGS     Dorsal subcutaneous edema.                                                                      Impression: Examination compromised by motion.     1. Multiple intra-articular tarsometatarsal fractures, as detailed  above.  a. Radiographically occult nondisplaced fracture through the head of  the first proximal phalanx.     2. At least a moderate grade Lisfranc complex ligamentous sprain as  detailed above, suboptimally detailed due to patient motion.     MYA DOW MD (Joe)

## 2021-10-21 NOTE — PATIENT INSTRUCTIONS
Thank you for choosing [facility] / Foot & Ankle Surgery!    DR. HENDRIX'S CLINIC LOCATIONS     [facility] [phone]   [address] [place] [address] APPOINTMENTS: [phone]   [place] [address] BILLING QUESTIONS: [phone]  [phone] RADIOLOGY: [phone]       [facility]    [address]    [place], [place] [id number] [phone]  [phone]      Follow up: 1 month     Next steps: wean out of the boot slowly, orthotics    [facility] LOCATIONS  [facility]  [address]  [place] [address]  Phone: [phone]  Fax: [phone] [facility]  [address]  [place] [address]  Phone: [phone]   Fax: [phone]   [facility]  [address]  [place] [address]  Phone: [phone]  Fax: [phone] [facility]  [address]  [place] [address]  Phone: [phone]   Fax: [phone]   [facility]   [address]  [place] [address]  Phone: [phone]  Fax: [phone] * Please call any location listed to make an appointment for a casting/fitting. Your referral was sent to their central office and they will all have the order on file.

## 2021-10-27 DIAGNOSIS — S99.922D INJURY OF LEFT FOOT, SUBSEQUENT ENCOUNTER: Primary | ICD-10-CM

## 2021-10-27 DIAGNOSIS — S93.325D LISFRANC DISLOCATION, LEFT, SUBSEQUENT ENCOUNTER: ICD-10-CM

## 2021-11-18 ENCOUNTER — OFFICE VISIT (OUTPATIENT)
Dept: PODIATRY | Facility: CLINIC | Age: 46
End: 2021-11-18
Payer: COMMERCIAL

## 2021-11-18 ENCOUNTER — ANCILLARY PROCEDURE (OUTPATIENT)
Dept: GENERAL RADIOLOGY | Facility: CLINIC | Age: 46
End: 2021-11-18
Attending: PODIATRIST
Payer: COMMERCIAL

## 2021-11-18 VITALS
SYSTOLIC BLOOD PRESSURE: 118 MMHG | DIASTOLIC BLOOD PRESSURE: 76 MMHG | HEIGHT: 70 IN | WEIGHT: 220 LBS | BODY MASS INDEX: 31.5 KG/M2

## 2021-11-18 DIAGNOSIS — S99.922D INJURY OF LEFT FOOT, SUBSEQUENT ENCOUNTER: Primary | ICD-10-CM

## 2021-11-18 PROCEDURE — 99213 OFFICE O/P EST LOW 20 MIN: CPT | Performed by: PODIATRIST

## 2021-11-18 PROCEDURE — 73630 X-RAY EXAM OF FOOT: CPT | Mod: LT | Performed by: RADIOLOGY

## 2021-11-18 ASSESSMENT — MIFFLIN-ST. JEOR: SCORE: 1894.16

## 2021-11-18 NOTE — LETTER
[facility]  [address]  [address] [place] [id number]  Phone: [phone]  Fax: [phone]      REPORT OF WORK ABILITY    Date: 11/18/2021                     Employee Name: Justin Sharma         YOB: 1975      To Whom It May Concern:    Justin Sharma was seen in clinic today for evaluation of his right foot. He sustained multiple midfoot fractures almost three months ago.  He has now returned to regular shoes and has custom orthoses.      He is no medically cleared to apply for, and return to, work.     Sincerely,       Dr. Justin WEBSTER [facility]iat[facility]/ Foot & Ankle Surgery

## 2021-11-18 NOTE — LETTER
11/18/2021         RE: Justin Sharma  [address]  [place] [address]        Dear Colleague,    Thank you for referring your patient, Justin Sharma, to the [facility] PODI[facility]RY. Please see a copy of my visit note below.    ASSESSMENT:  Encounter Diagnosis   Name Primary?     Injury of left foot, subsequent encounter Yes   Multiple midfoot fractures as noted below.    MEDICAL DECISION MAKING:  Lisfranc injury, left foot involving multiple fractures: Base of first metatarsal, medial cuneiform, base of second metatarsal, base of third metatarsal, base of fourth metatarsal, lateral cuboid, intermediate cuneiform, lateral cuneiform, hallux proximal phalanx.    At this point he can return to regular shoes.  He has done this today.  He has received and is using his multidensity custom orthotic devices.  I reviewed the severity of his injury and explained the likelihood of midfoot arthrosis and potential increasing pain in the future.  We should renew the orthotics yearly.  Future pain that is not adequately controlled with conservative measures, might necessitate arthrodesis of multiple joints.  I also discussed the option of an image guided steroid injection, should joints become painful.  Gradual return to weightbearing activities.  Physical therapy was offered.  He declined.  A letter was provided stating that he can resume applying for work and return to work.    Follow-up in 2 months.    Disclaimer: This note consists of symbols derived from keyboarding, dictation and/or voice recognition software. As a result, there may be errors in the script that have gone undetected. Please consider this when interpreting information found in this chart.    Justin Langford DPM, FACFAS, [facility] Department of Podiatry/Foot & Ankle Surgery      ____________________________________________________________________    HPI:       Justin follows up for his left foot injury.  Please refer to the  9/1/2021 note for details regarding the injury.  He sustained fractures of the left second, third and fourth metatarsal bases with intra-articular extension seen in XR.  An MRI was completed after his last visit, 9/23/21 - see below  This is considered a Lisfranc type injury.  He was initially nonweightbearing with crutches and wearing an Aircast for protection.    He continues to improve and now rates his pain a 2 out of 10 on the pain scale.  He experiences some aching.  *No past medical history on file.*  *No past surgical history on file.*  *  Current Outpatient Medications   Medication Sig Dispense Refill     dorzolamide (TRUSOPT) 2 % ophthalmic solution INSTILL 1 DROP IN BOTH EYES TWICE DAILY       latanoprost (XALATAN) 0.005 % ophthalmic solution INSTILL 1 DROP IN BOTH EYES EVERY NIGHT AT BEDTIME       levothyroxine (SYNTHROID/LEVOTHROID) 25 MCG tablet Take 25 mcg by mouth       loratadine (CLARITIN) 10 MG tablet Take 10 mg by mouth daily as needed for allergies           EXAM:      Constitutional:  Justin Sharma is in no apparent distress, appears well-nourished.  Cooperative with history and physical exam.    Vascular:  Pedal pulses are palpable for both the DP and PT arteries.  CFT < 3 sec. interval reduction in left foot edema.    Neuro: Light touch sensation is intact to the L4, L5, S1 distributions  No evidence of weakness, spasticity, or contracture in the lower extremities.     Derm: Normal texture and turgor.  No erythema, ecchymosis, or cyanosis.  No open lesions.     Musculoskeletal:    Lower extremity muscle strength is normal. No gross deformities.  No evidence of any decrease in arch height since last visit.  No obvious forefoot abduction.  He has some pain on palpation over the left cuboid.  No pain with light manipulation of the midfoot joints.    X-Ray Findings:  I personally reviewed the right foot images.  There appears to be osseous healing of the fractures that were seen on x-ray  previously.    MR left foot without  contrast 9/23/2021 4:42 PM     History: Foot trauma, Lisfranc suspected, xray done (Age >= 6y);  Closed nondisplaced fracture of second metatarsal bone of left foot,  initial encounter; Lisfranc dislocation, left, initial encounter     Techniques: Multiplanar multisequence imaging of the left foot was  obtained without  administration of intravenous contrast.     Comparison: Radiographs 8/28/2021, 9/23/2021     Findings: Images degraded by motion.     Bones     Nondisplaced intra-articular fracture of the base of the first  metatarsal. Suspect nondisplaced fracture through the medial cuneiform  on sagittal series 7 images 8-10. Comminuted and mildly displaced  intra-articular fracture of the base of the second metatarsal.  Minimally displaced intra-articular fracture of the base of the third  metatarsal. Mild interspace intra-articular fracture of the plantar  base of the fourth metatarsal. Nondisplaced fracture of the distal  lateral cuboid. Probable fracture of the distal lateral intermediate  cuneiform. Probable fracture of the lateral cuneiform as well. Bone  marrow edema like signal throughout the shaft of the second  metatarsal, likely posttraumatic versus stress.      Nondisplaced intra-articular fracture of the head of the first  proximal phalanx, radiographically occult. Loss of fat saturation of  the toes on long axis imaging.     Joints and periarticular soft tissue     Joint effusion: Physiologic amount of joint fluid are present.     Plantar plates: Intersesamoidal ligament and sesamoidal phalangeal  ligaments of the first metatarsophalangeal joints are intact. Plantar  plates of the second through fifth toe at metatarsophalangeal joints  are grossly intact.     Intermetatarsal spaces: Mild nonspecific soft tissue prominence in the  second interspace an third interspace. No significant intermetatarsal  bursal fluid.     Ligaments and Tendons     Lisfranc interosseous  ligament: Lisfranc evaluation compromised by  motion, especially on short axis imaging. Thickening and increased  signal in the interosseous component. Dorsal component not clearly  seen. Thickening and increased signal in the plantar component between  the medial cuneiform and base of the third metatarsal. Plantar  component between the medial cuneiform and base of the second  metatarsal not clearly seen, possibly torn.     Tendons: The visualized courses of flexor and extensor tendons are  grossly intact.     Muscles     Multifocal muscular edema.     ANCILLARY FINDINGS     Dorsal subcutaneous edema.                                                                      Impression: Examination compromised by motion.     1. Multiple intra-articular tarsometatarsal fractures, as detailed  above.  a. Radiographically occult nondisplaced fracture through the head of  the first proximal phalanx.     2. At least a moderate grade Lisfranc complex ligamentous sprain as  detailed above, suboptimally detailed due to patient motion.     MYA DOW MD (Joe)           Again, thank you for allowing me to participate in the care of your patient.        Sincerely,        Justin Langford DPM

## 2021-11-18 NOTE — PROGRESS NOTES
ASSESSMENT:  Encounter Diagnosis   Name Primary?     Injury of left foot, subsequent encounter Yes   Multiple midfoot fractures as noted below.    MEDICAL DECISION MAKING:  Lisfranc injury, left foot involving multiple fractures: Base of first metatarsal, medial cuneiform, base of second metatarsal, base of third metatarsal, base of fourth metatarsal, lateral cuboid, intermediate cuneiform, lateral cuneiform, hallux proximal phalanx.    At this point he can return to regular shoes.  He has done this today.  He has received and is using his multidensity custom orthotic devices.  I reviewed the severity of his injury and explained the likelihood of midfoot arthrosis and potential increasing pain in the future.  We should renew the orthotics yearly.  Future pain that is not adequately controlled with conservative measures, might necessitate arthrodesis of multiple joints.  I also discussed the option of an image guided steroid injection, should joints become painful.  Gradual return to weightbearing activities.  Physical therapy was offered.  He declined.  A letter was provided stating that he can resume applying for work and return to work.    Follow-up in 2 months.    Disclaimer: This note consists of symbols derived from keyboarding, dictation and/or voice recognition software. As a result, there may be errors in the script that have gone undetected. Please consider this when interpreting information found in this chart.    Justin Langford DPM, FACFAS, MS    [facility] Department of Podiatry/Foot & Ankle Surgery      ____________________________________________________________________    HPI:       Justin follows up for his left foot injury.  Please refer to the 9/1/2021 note for details regarding the injury.  He sustained fractures of the left second, third and fourth metatarsal bases with intra-articular extension seen in XR.  An MRI was completed after his last visit, 9/23/21 - see below  This is considered a  Lisfranc type injury.  He was initially nonweightbearing with crutches and wearing an Aircast for protection.    He continues to improve and now rates his pain a 2 out of 10 on the pain scale.  He experiences some aching.  *No past medical history on file.*  *No past surgical history on file.*  *  Current Outpatient Medications   Medication Sig Dispense Refill     dorzolamide (TRUSOPT) 2 % ophthalmic solution INSTILL 1 DROP IN BOTH EYES TWICE DAILY       latanoprost (XALATAN) 0.005 % ophthalmic solution INSTILL 1 DROP IN BOTH EYES EVERY NIGHT AT BEDTIME       levothyroxine (SYNTHROID/LEVOTHROID) 25 MCG tablet Take 25 mcg by mouth       loratadine (CLARITIN) 10 MG tablet Take 10 mg by mouth daily as needed for allergies           EXAM:      Constitutional:  Justin Sharma is in no apparent distress, appears well-nourished.  Cooperative with history and physical exam.    Vascular:  Pedal pulses are palpable for both the DP and PT arteries.  CFT < 3 sec. interval reduction in left foot edema.    Neuro: Light touch sensation is intact to the L4, L5, S1 distributions  No evidence of weakness, spasticity, or contracture in the lower extremities.     Derm: Normal texture and turgor.  No erythema, ecchymosis, or cyanosis.  No open lesions.     Musculoskeletal:    Lower extremity muscle strength is normal. No gross deformities.  No evidence of any decrease in arch height since last visit.  No obvious forefoot abduction.  He has some pain on palpation over the left cuboid.  No pain with light manipulation of the midfoot joints.    X-Ray Findings:  I personally reviewed the right foot images.  There appears to be osseous healing of the fractures that were seen on x-ray previously.    MR left foot without  contrast 9/23/2021 4:42 PM     History: Foot trauma, Lisfranc suspected, xray done (Age >= 6y);  Closed nondisplaced fracture of second metatarsal bone of left foot,  initial encounter; Lisfranc dislocation, left, initial  encounter     Techniques: Multiplanar multisequence imaging of the left foot was  obtained without  administration of intravenous contrast.     Comparison: Radiographs 8/28/2021, 9/23/2021     Findings: Images degraded by motion.     Bones     Nondisplaced intra-articular fracture of the base of the first  metatarsal. Suspect nondisplaced fracture through the medial cuneiform  on sagittal series 7 images 8-10. Comminuted and mildly displaced  intra-articular fracture of the base of the second metatarsal.  Minimally displaced intra-articular fracture of the base of the third  metatarsal. Mild interspace intra-articular fracture of the plantar  base of the fourth metatarsal. Nondisplaced fracture of the distal  lateral cuboid. Probable fracture of the distal lateral intermediate  cuneiform. Probable fracture of the lateral cuneiform as well. Bone  marrow edema like signal throughout the shaft of the second  metatarsal, likely posttraumatic versus stress.      Nondisplaced intra-articular fracture of the head of the first  proximal phalanx, radiographically occult. Loss of fat saturation of  the toes on long axis imaging.     Joints and periarticular soft tissue     Joint effusion: Physiologic amount of joint fluid are present.     Plantar plates: Intersesamoidal ligament and sesamoidal phalangeal  ligaments of the first metatarsophalangeal joints are intact. Plantar  plates of the second through fifth toe at metatarsophalangeal joints  are grossly intact.     Intermetatarsal spaces: Mild nonspecific soft tissue prominence in the  second interspace an third interspace. No significant intermetatarsal  bursal fluid.     Ligaments and Tendons     Lisfranc interosseous ligament: Lisfranc evaluation compromised by  motion, especially on short axis imaging. Thickening and increased  signal in the interosseous component. Dorsal component not clearly  seen. Thickening and increased signal in the plantar component between  the  medial cuneiform and base of the third metatarsal. Plantar  component between the medial cuneiform and base of the second  metatarsal not clearly seen, possibly torn.     Tendons: The visualized courses of flexor and extensor tendons are  grossly intact.     Muscles     Multifocal muscular edema.     ANCILLARY FINDINGS     Dorsal subcutaneous edema.                                                                      Impression: Examination compromised by motion.     1. Multiple intra-articular tarsometatarsal fractures, as detailed  above.  a. Radiographically occult nondisplaced fracture through the head of  the first proximal phalanx.     2. At least a moderate grade Lisfranc complex ligamentous sprain as  detailed above, suboptimally detailed due to patient motion.     MYA (Victoria DOW MD

## 2021-12-24 ENCOUNTER — OFFICE VISIT (OUTPATIENT)
Dept: PODIATRY | Facility: CLINIC | Age: 46
End: 2021-12-24
Payer: COMMERCIAL

## 2021-12-24 ENCOUNTER — ANCILLARY PROCEDURE (OUTPATIENT)
Dept: GENERAL RADIOLOGY | Facility: CLINIC | Age: 46
End: 2021-12-24
Attending: PODIATRIST
Payer: COMMERCIAL

## 2021-12-24 VITALS
BODY MASS INDEX: 31.5 KG/M2 | DIASTOLIC BLOOD PRESSURE: 72 MMHG | HEIGHT: 70 IN | WEIGHT: 220 LBS | SYSTOLIC BLOOD PRESSURE: 120 MMHG

## 2021-12-24 DIAGNOSIS — S99.922D INJURY OF LEFT FOOT, SUBSEQUENT ENCOUNTER: Primary | ICD-10-CM

## 2021-12-24 DIAGNOSIS — S93.325D LISFRANC DISLOCATION, LEFT, SUBSEQUENT ENCOUNTER: ICD-10-CM

## 2021-12-24 DIAGNOSIS — S99.922D INJURY OF LEFT FOOT, SUBSEQUENT ENCOUNTER: ICD-10-CM

## 2021-12-24 PROCEDURE — 99213 OFFICE O/P EST LOW 20 MIN: CPT | Performed by: PODIATRIST

## 2021-12-24 PROCEDURE — 73630 X-RAY EXAM OF FOOT: CPT | Mod: LT | Performed by: RADIOLOGY

## 2021-12-24 ASSESSMENT — MIFFLIN-ST. JEOR: SCORE: 1894.16

## 2021-12-24 NOTE — LETTER
12/24/2021         RE: Justin Sharma  [address]  [place] [address]        Dear Colleague,    Thank you for referring your patient, Justin Sharma, to the [facility] PODI[facility]. Please see a copy of my visit note below.    ASSESSMENT:  Encounter Diagnoses   Name Primary?     Injury of left foot, subsequent encounter Yes     Lisfranc dislocation, left, subsequent encounter    Multiple midfoot fractures as noted below.    MEDICAL DECISION MAKING:  Lisfranc injury, left foot involving multiple fractures: Base of first metatarsal, medial cuneiform, base of second metatarsal, base of third metatarsal, base of fourth metatarsal, lateral cuboid, intermediate cuneiform, lateral cuneiform, hallux proximal phalanx.    Justin has done well so far with conservative treatment.  He reports relative comfort when not on his feet for 12 hours a day lifting and carrying.  Work seems to cause the most pain.  He very much would like to continue to work.  He explained that there might be other options for him and a letter from me might be helpful.    I provided a letter recommending that he not perform prolonged weightbearing work.  More time seated would be beneficial.    I explained to Justin that he might need a referral to occupational medicine for functional capacity testing.    Reviewed that his injury is considered a severe foot injury.  He might always have pain.  Future arthrodesis of involved joints remains a possibility.  He states understanding.    I explained that he might not reach his maximal medical improvement until a year after injury.  If at any point he finds that the pain is more than he can live with, surgery can be considered.  Follow-up on an as-needed basis.    Disclaimer: This note consists of symbols derived from keyboarding, dictation and/or voice recognition software. As a result, there may be errors in the script that have gone undetected. Please consider this when  interpreting information found in this chart.    Justin Langford, HOLLIS, FACFAS, MS    [facility] Department of Podiatry/Foot & Ankle Surgery      ____________________________________________________________________    HPI:       Justin follows up for his left foot injury.  Please refer to the 9/1/2021 note for details regarding the injury.  He sustained fractures of the left second, third and fourth metatarsal bases with intra-articular extension seen in XR.  An MRI was completed 9/23/21 - see below  This is considered a Lisfranc type injury.  He was initially nonweightbearing with crutches and wearing an Aircast for protection.  Justin was doing well with conservative care and attempted to return to work.  Reports that he worked for 1 week.  This involves standing lifting and caring for up to 12 hours.  He experienced increasing left foot swelling and pain to the point that work was difficult.    He requests a letter for his employer asking if other work options might be available that would place less demand on his foot.  He shows me a picture of his foot on his phone, demonstrating increased edema.  Outside of work, he reports that he is doing well and relatively comfortable.     *No past medical history on file.*  *No past surgical history on file.*  *  Current Outpatient Medications   Medication Sig Dispense Refill     dorzolamide (TRUSOPT) 2 % ophthalmic solution INSTILL 1 DROP IN BOTH EYES TWICE DAILY       latanoprost (XALATAN) 0.005 % ophthalmic solution INSTILL 1 DROP IN BOTH EYES EVERY NIGHT AT BEDTIME       levothyroxine (SYNTHROID/LEVOTHROID) 25 MCG tablet Take 25 mcg by mouth       loratadine (CLARITIN) 10 MG tablet Take 10 mg by mouth daily as needed for allergies           EXAM:      Constitutional:  Justin Sharma is in no apparent distress, appears well-nourished.  Cooperative with history and physical exam.    Vascular:  Pedal pulses are palpable for both the DP and PT arteries.  CFT < 3 sec.  moderate left midfoot edema.    Neuro: Light touch sensation is intact to the L4, L5, S1 distributions  No evidence of weakness, spasticity, or contracture in the lower extremities.     Derm: Normal texture and turgor.  No erythema, ecchymosis, or cyanosis.  No open lesions.     Musculoskeletal:    Lower extremity muscle strength is normal. No gross deformities.  No evidence of any decrease in arch height since last visit.  No obvious forefoot abduction.  No pain with light manipulation of the midfoot joints.    X-Ray Findings:  I personally reviewed the right foot images.  There appears to be osseous healing of the fractures that were seen on x-ray previously.    MR left foot without  contrast 9/23/2021 4:42 PM     History: Foot trauma, Lisfranc suspected, xray done (Age >= 6y);  Closed nondisplaced fracture of second metatarsal bone of left foot,  initial encounter; Lisfranc dislocation, left, initial encounter     Techniques: Multiplanar multisequence imaging of the left foot was  obtained without  administration of intravenous contrast.     Comparison: Radiographs 8/28/2021, 9/23/2021     Findings: Images degraded by motion.     Bones     Nondisplaced intra-articular fracture of the base of the first  metatarsal. Suspect nondisplaced fracture through the medial cuneiform  on sagittal series 7 images 8-10. Comminuted and mildly displaced  intra-articular fracture of the base of the second metatarsal.  Minimally displaced intra-articular fracture of the base of the third  metatarsal. Mild interspace intra-articular fracture of the plantar  base of the fourth metatarsal. Nondisplaced fracture of the distal  lateral cuboid. Probable fracture of the distal lateral intermediate  cuneiform. Probable fracture of the lateral cuneiform as well. Bone  marrow edema like signal throughout the shaft of the second  metatarsal, likely posttraumatic versus stress.      Nondisplaced intra-articular fracture of the head of the  first  proximal phalanx, radiographically occult. Loss of fat saturation of  the toes on long axis imaging.     Joints and periarticular soft tissue     Joint effusion: Physiologic amount of joint fluid are present.     Plantar plates: Intersesamoidal ligament and sesamoidal phalangeal  ligaments of the first metatarsophalangeal joints are intact. Plantar  plates of the second through fifth toe at metatarsophalangeal joints  are grossly intact.     Intermetatarsal spaces: Mild nonspecific soft tissue prominence in the  second interspace an third interspace. No significant intermetatarsal  bursal fluid.     Ligaments and Tendons     Lisfranc interosseous ligament: Lisfranc evaluation compromised by  motion, especially on short axis imaging. Thickening and increased  signal in the interosseous component. Dorsal component not clearly  seen. Thickening and increased signal in the plantar component between  the medial cuneiform and base of the third metatarsal. Plantar  component between the medial cuneiform and base of the second  metatarsal not clearly seen, possibly torn.     Tendons: The visualized courses of flexor and extensor tendons are  grossly intact.     Muscles     Multifocal muscular edema.     ANCILLARY FINDINGS     Dorsal subcutaneous edema.                                                                      Impression: Examination compromised by motion.     1. Multiple intra-articular tarsometatarsal fractures, as detailed  above.  a. Radiographically occult nondisplaced fracture through the head of  the first proximal phalanx.     2. At least a moderate grade Lisfranc complex ligamentous sprain as  detailed above, suboptimally detailed due to patient motion.     MYA DOW MD (Joe)           Again, thank you for allowing me to participate in the care of your patient.        Sincerely,        Justin Langford DPM

## 2021-12-24 NOTE — PATIENT INSTRUCTIONS
Thank you for choosing [facility] / Foot & Ankle Surgery!    DR. HENDRIX'S CLINIC LOCATIONS     [facility] SURGERY: [phone]   [address] [place] [address] APPOINTMENTS: [phone]   [place] [address] BILLING QUESTIONS: [phone]  [phone] RADIOLOGY: [phone]       [facility]    [id number]    [place] [address]    [phone]  [phone]

## 2021-12-24 NOTE — PROGRESS NOTES
ASSESSMENT:  Encounter Diagnoses   Name Primary?     Injury of left foot, subsequent encounter Yes     Lisfranc dislocation, left, subsequent encounter    Multiple midfoot fractures as noted below.    MEDICAL DECISION MAKING:  Lisfranc injury, left foot involving multiple fractures: Base of first metatarsal, medial cuneiform, base of second metatarsal, base of third metatarsal, base of fourth metatarsal, lateral cuboid, intermediate cuneiform, lateral cuneiform, hallux proximal phalanx.    Justin has done well so far with conservative treatment.  He reports relative comfort when not on his feet for 12 hours a day lifting and carrying.  Work seems to cause the most pain.  He very much would like to continue to work.  He explained that there might be other options for him and a letter from me might be helpful.    I provided a letter recommending that he not perform prolonged weightbearing work.  More time seated would be beneficial.    I explained to Justin that he might need a referral to occupational medicine for functional capacity testing.    Reviewed that his injury is considered a severe foot injury.  He might always have pain.  Future arthrodesis of involved joints remains a possibility.  He states understanding.    I explained that he might not reach his maximal medical improvement until a year after injury.  If at any point he finds that the pain is more than he can live with, surgery can be considered.  Follow-up on an as-needed basis.    Disclaimer: This note consists of symbols derived from keyboarding, dictation and/or voice recognition software. As a result, there may be errors in the script that have gone undetected. Please consider this when interpreting information found in this chart.    Justin Langford, HOLLIS, FACFAS, MS    [facility]      ____________________________________________________________________    HPI:       Justin follows up for his left foot  injury.  Please refer to the 9/1/2021 note for details regarding the injury.  He sustained fractures of the left second, third and fourth metatarsal bases with intra-articular extension seen in XR.  An MRI was completed 9/23/21 - see below  This is considered a Lisfranc type injury.  He was initially nonweightbearing with crutches and wearing an Aircast for protection.  Justin was doing well with conservative care and attempted to return to work.  Reports that he worked for 1 week.  This involves standing lifting and caring for up to 12 hours.  He experienced increasing left foot swelling and pain to the point that work was difficult.    He requests a letter for his employer asking if other work options might be available that would place less demand on his foot.  He shows me a picture of his foot on his phone, demonstrating increased edema.  Outside of work, he reports that he is doing well and relatively comfortable.     *No past medical history on file.*  *No past surgical history on file.*  *  Current Outpatient Medications   Medication Sig Dispense Refill     dorzolamide (TRUSOPT) 2 % ophthalmic solution INSTILL 1 DROP IN BOTH EYES TWICE DAILY       latanoprost (XALATAN) 0.005 % ophthalmic solution INSTILL 1 DROP IN BOTH EYES EVERY NIGHT AT BEDTIME       levothyroxine (SYNTHROID/LEVOTHROID) 25 MCG tablet Take 25 mcg by mouth       loratadine (CLARITIN) 10 MG tablet Take 10 mg by mouth daily as needed for allergies           EXAM:      Constitutional:  Justin Sharma is in no apparent distress, appears well-nourished.  Cooperative with history and physical exam.    Vascular:  Pedal pulses are palpable for both the DP and PT arteries.  CFT < 3 sec. moderate left midfoot edema.    Neuro: Light touch sensation is intact to the L4, L5, S1 distributions  No evidence of weakness, spasticity, or contracture in the lower extremities.     Derm: Normal texture and turgor.  No erythema, ecchymosis, or cyanosis.  No open  lesions.     Musculoskeletal:    Lower extremity muscle strength is normal. No gross deformities.  No evidence of any decrease in arch height since last visit.  No obvious forefoot abduction.  No pain with light manipulation of the midfoot joints.    X-Ray Findings:  I personally reviewed the right foot images.  There appears to be osseous healing of the fractures that were seen on x-ray previously.    MR left foot without  contrast 9/23/2021 4:42 PM     History: Foot trauma, Lisfranc suspected, xray done (Age >= 6y);  Closed nondisplaced fracture of second metatarsal bone of left foot,  initial encounter; Lisfranc dislocation, left, initial encounter     Techniques: Multiplanar multisequence imaging of the left foot was  obtained without  administration of intravenous contrast.     Comparison: Radiographs 8/28/2021, 9/23/2021     Findings: Images degraded by motion.     Bones     Nondisplaced intra-articular fracture of the base of the first  metatarsal. Suspect nondisplaced fracture through the medial cuneiform  on sagittal series 7 images 8-10. Comminuted and mildly displaced  intra-articular fracture of the base of the second metatarsal.  Minimally displaced intra-articular fracture of the base of the third  metatarsal. Mild interspace intra-articular fracture of the plantar  base of the fourth metatarsal. Nondisplaced fracture of the distal  lateral cuboid. Probable fracture of the distal lateral intermediate  cuneiform. Probable fracture of the lateral cuneiform as well. Bone  marrow edema like signal throughout the shaft of the second  metatarsal, likely posttraumatic versus stress.      Nondisplaced intra-articular fracture of the head of the first  proximal phalanx, radiographically occult. Loss of fat saturation of  the toes on long axis imaging.     Joints and periarticular soft tissue     Joint effusion: Physiologic amount of joint fluid are present.     Plantar plates: Intersesamoidal ligament and  sesamoidal phalangeal  ligaments of the first metatarsophalangeal joints are intact. Plantar  plates of the second through fifth toe at metatarsophalangeal joints  are grossly intact.     Intermetatarsal spaces: Mild nonspecific soft tissue prominence in the  second interspace an third interspace. No significant intermetatarsal  bursal fluid.     Ligaments and Tendons     Lisfranc interosseous ligament: Lisfranc evaluation compromised by  motion, especially on short axis imaging. Thickening and increased  signal in the interosseous component. Dorsal component not clearly  seen. Thickening and increased signal in the plantar component between  the medial cuneiform and base of the third metatarsal. Plantar  component between the medial cuneiform and base of the second  metatarsal not clearly seen, possibly torn.     Tendons: The visualized courses of flexor and extensor tendons are  grossly intact.     Muscles     Multifocal muscular edema.     ANCILLARY FINDINGS     Dorsal subcutaneous edema.                                                                      Impression: Examination compromised by motion.     1. Multiple intra-articular tarsometatarsal fractures, as detailed  above.  a. Radiographically occult nondisplaced fracture through the head of  the first proximal phalanx.     2. At least a moderate grade Lisfranc complex ligamentous sprain as  detailed above, suboptimally detailed due to patient motion.     MYA DOW MD (Joe)

## 2021-12-24 NOTE — LETTER
[facility]  [address]  [address]  [place] [address]  Phone: [phone]  Fax: [phone]      REPORT OF WORK ABILITY      Date: 12/24/2021                     Employee Name: Justin Sharma         YOB: 1975      To Whom It May Concern:    Justin Sharma was evaluated today in clinic, in follow-up for a left foot injury that occurred in late August 2021.  He sustained multiple midfoot fractures and injuries, considered to be severe.  Conservative treatment was employed and he has done well.  However with a recent return to work for a week, he has developed increasing pain and swelling.    I am not sure if Justin will be able to sustain prolonged weightbearing and lifting activities at work.  Although he is not considered to be at his maximal medical improvement, this injury can result in chronic pain.  He very much would like to continue working.      If at all possible, we asked that some form of seated work be provided if possible, or a mix of seated and standing.  Prolonged weightbearing and heavy lifting is not recommended.    A referral to occupational medicine for functional capacity testing is considered.        Sincerely,        Dr. Justin Langford